# Patient Record
Sex: MALE | Race: ASIAN | NOT HISPANIC OR LATINO | URBAN - METROPOLITAN AREA
[De-identification: names, ages, dates, MRNs, and addresses within clinical notes are randomized per-mention and may not be internally consistent; named-entity substitution may affect disease eponyms.]

---
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
## 2025-04-28 ENCOUNTER — EMERGENCY (EMERGENCY)
Facility: HOSPITAL | Age: 20
LOS: 1 days | End: 2025-04-28
Attending: EMERGENCY MEDICINE
Payer: COMMERCIAL

## 2025-04-28 VITALS
WEIGHT: 179.9 LBS | HEIGHT: 69 IN | HEART RATE: 83 BPM | DIASTOLIC BLOOD PRESSURE: 86 MMHG | TEMPERATURE: 98 F | OXYGEN SATURATION: 99 % | RESPIRATION RATE: 17 BRPM | SYSTOLIC BLOOD PRESSURE: 136 MMHG

## 2025-04-28 VITALS — OXYGEN SATURATION: 99 % | HEART RATE: 79 BPM | RESPIRATION RATE: 16 BRPM

## 2025-04-28 PROCEDURE — 99282 EMERGENCY DEPT VISIT SF MDM: CPT

## 2025-04-28 PROCEDURE — 99283 EMERGENCY DEPT VISIT LOW MDM: CPT

## 2025-04-28 PROCEDURE — 99053 MED SERV 10PM-8AM 24 HR FAC: CPT

## 2025-04-28 RX ORDER — LIDOCAINE HCL/EPINEPHRINE/PF 1 %-1:200K
10 AMPUL (ML) INJECTION ONCE
Refills: 0 | Status: COMPLETED | OUTPATIENT
Start: 2025-04-28 | End: 2025-04-28

## 2025-04-28 RX ADMIN — Medication 10 MILLILITER(S): at 08:30

## 2025-04-28 NOTE — ED PROVIDER NOTE - CARE PLAN
Principal Discharge DX:	Inguinal abscess  Goal:	Drainage and pain relief  Assessment and plan of treatment:	will perform bedside I&D with local anesthesia with healing to secondary intention. No indication for antibiotics.   1

## 2025-04-28 NOTE — ED PROVIDER NOTE - PLAN OF CARE
Drainage and pain relief will perform bedside I&D with local anesthesia with healing to secondary intention. No indication for antibiotics.

## 2025-04-28 NOTE — ED PROVIDER NOTE - NSFOLLOWUPINSTRUCTIONS_ED_ALL_ED_FT
Abscess    An abscess is an infected area that contains a collection of pus and debris. It can occur in almost any part of the body and occurs when the tissue gets infection. Symptoms include a painful mass that is red, warm, tender that might break open and HAVE drainage. While you were in the ED an incision and drainage of your abscess was performed. Please apply a warm moist towel to the affected area daily. Closure of the wound should not occur for the first five days to ensure the abscess fully drains.     SEEK IMMEDIATE MEDICAL CARE IF YOU HAVE ANY OF THE FOLLOWING SYMPTOMS: chills, fever, muscle aches, or red streaking from the area.

## 2025-04-28 NOTE — ED PROVIDER NOTE - PATIENT PORTAL LINK FT
You can access the FollowMyHealth Patient Portal offered by [facility] by registering at the following website: [email or web address]. By joining [employer]’s FollowMyHealth portal, you will also be able to view your health information using other applications (apps) compatible with our system.

## 2025-04-28 NOTE — ED PROVIDER NOTE - OBJECTIVE STATEMENT
19M NPSx [facility] presenting with L groin abscess (L inguinal crease) 4/20 s/p I&D at home and follicular extraction now with recurrent pain and reaccumulation of abscess. Endorses purulent exduate and pain limiting ambulation. Patient afebrile, VSS. Denies subjective fevers, chills, recent antibiotic use, prior hx abscess. Will perform bedside I&D.

## 2025-04-28 NOTE — ED PROVIDER NOTE - CLINICAL SUMMARY MEDICAL DECISION MAKING FREE TEXT BOX
19M NPSx [facility] presenting with L groin abscess (L inguinal crease) 4/20 s/p I&D at home and follicular extraction now with recurrent pain and reaccumulation of abscess. Endorses purulent exduate and pain limiting ambulation. Patient afebrile, VSS. Denies subjective fevers, chills, recent antibiotic use, prior hx abscess. Will perform bedside I&D. 19M NPSx [facility] presenting with L groin abscess (L inguinal crease) 4/20 s/p I&D at home and follicular extraction now with recurrent pain and reaccumulation of abscess. Endorses purulent exduate and pain limiting ambulation. Patient afebrile, VSS. Denies subjective fevers, chills, recent antibiotic use, prior hx abscess. S/p I&D with 5cc serosanguinous drainage. [place]: 19M NPSx [facility] presenting with L groin abscess (L inguinal crease) 4/20 s/p I&D at home and follicular extraction now with recurrent pain and reaccumulation of abscess. Endorses purulent exduate and pain limiting ambulation. Patient afebrile, VSS. Denies subjective fevers, chills, recent antibiotic use, prior hx abscess. S/p I&D with 5cc serosanguinous drainage.

## 2025-04-28 NOTE — ED CLERICAL - NS ED CLERK NOTE PRE-ARRIVAL INFORMATION; ADDITIONAL PRE-ARRIVAL INFORMATION
CC/Reason For referral: Left groin abscess needing drainage  Preferred Consultant(if applicable): N/A  Who admits for you (if needed): N/A  Do you have documents you would like to fax over? No  Would you still like to speak to an ED attending? Yes, please call Dr. South from [facility] [phone] after patient is seen